# Patient Record
Sex: FEMALE | Race: WHITE | NOT HISPANIC OR LATINO
[De-identification: names, ages, dates, MRNs, and addresses within clinical notes are randomized per-mention and may not be internally consistent; named-entity substitution may affect disease eponyms.]

---

## 2019-08-06 ENCOUNTER — RX RENEWAL (OUTPATIENT)
Age: 84
End: 2019-08-06

## 2019-08-06 DIAGNOSIS — M17.0 BILATERAL PRIMARY OSTEOARTHRITIS OF KNEE: ICD-10-CM

## 2019-08-06 PROBLEM — Z00.00 ENCOUNTER FOR PREVENTIVE HEALTH EXAMINATION: Status: ACTIVE | Noted: 2019-08-06

## 2019-08-06 RX ORDER — HYALURONATE SODIUM, STABILIZED 88 MG/4 ML
88 SYRINGE (ML) INTRAARTICULAR
Qty: 2 | Refills: 0 | Status: ACTIVE | OUTPATIENT
Start: 2019-08-06

## 2019-08-08 ENCOUNTER — APPOINTMENT (OUTPATIENT)
Dept: ORTHOPEDIC SURGERY | Facility: CLINIC | Age: 84
End: 2019-08-08
Payer: MEDICARE

## 2019-08-08 VITALS — HEIGHT: 63 IN | BODY MASS INDEX: 25.69 KG/M2 | WEIGHT: 145 LBS

## 2019-08-08 PROCEDURE — 20611 DRAIN/INJ JOINT/BURSA W/US: CPT | Mod: RT

## 2019-08-08 PROCEDURE — 99214 OFFICE O/P EST MOD 30 MIN: CPT

## 2019-08-08 PROCEDURE — 99204 OFFICE O/P NEW MOD 45 MIN: CPT | Mod: 25

## 2019-08-08 PROCEDURE — 73562 X-RAY EXAM OF KNEE 3: CPT | Mod: 50

## 2019-08-08 NOTE — HISTORY OF PRESENT ILLNESS
[de-identified] : KIMMY KNEE CORTISONES \par \par WE ORDERED AND RECEIVED HOUSE SUPPLY - RIGHT KNEE

## 2019-08-08 NOTE — PHYSICAL EXAM
[de-identified] : AP of both knees and laterals and sunrise views are obtained showing severe osteoarthritis of the lateral compartment of the right knee. There is moderate valgus and pronation as well. [de-identified] : Right knee exam today is lateral joint line tenderness and warmth. She has a moderate valgus inclination of approximately 15°. Range of motion is 0-105°

## 2019-08-08 NOTE — PROCEDURE
[de-identified] : Patient was given a cortisone injection today. This is done under sterile conditions an ultrasound guidance the lateral aspect of the right knee. Patient tolerated the procedure well

## 2019-08-10 ENCOUNTER — TRANSCRIPTION ENCOUNTER (OUTPATIENT)
Age: 84
End: 2019-08-10

## 2019-08-21 ENCOUNTER — APPOINTMENT (OUTPATIENT)
Dept: ORTHOPEDIC SURGERY | Facility: CLINIC | Age: 84
End: 2019-08-21
Payer: MEDICARE

## 2019-08-21 PROCEDURE — 99214 OFFICE O/P EST MOD 30 MIN: CPT | Mod: 25

## 2019-08-21 PROCEDURE — 20611 DRAIN/INJ JOINT/BURSA W/US: CPT | Mod: RT

## 2019-08-21 NOTE — PHYSICAL EXAM
[de-identified] : Right knee exam today is lateral joint line tenderness and warmth. She has a moderate valgus inclination of approximately 15°. Range of motion is 0-105°

## 2019-08-21 NOTE — PROCEDURE
[de-identified] : She was given a right knee Monovisc injection today. This is done under sterile conditions an ultrasound guidance. Patient tolerated the procedure well